# Patient Record
Sex: MALE | Race: BLACK OR AFRICAN AMERICAN | Employment: OTHER | URBAN - METROPOLITAN AREA
[De-identification: names, ages, dates, MRNs, and addresses within clinical notes are randomized per-mention and may not be internally consistent; named-entity substitution may affect disease eponyms.]

---

## 2017-01-29 ENCOUNTER — APPOINTMENT (OUTPATIENT)
Dept: CT IMAGING | Age: 37
End: 2017-01-29
Payer: SELF-PAY

## 2017-01-29 ENCOUNTER — HOSPITAL ENCOUNTER (EMERGENCY)
Age: 37
Discharge: HOME | End: 2017-01-29
Attending: EMERGENCY MEDICINE | Admitting: EMERGENCY MEDICINE
Payer: SELF-PAY

## 2017-01-29 ENCOUNTER — APPOINTMENT (OUTPATIENT)
Dept: ULTRASOUND IMAGING | Age: 37
End: 2017-01-29
Payer: SELF-PAY

## 2017-01-29 VITALS
DIASTOLIC BLOOD PRESSURE: 72 MMHG | HEIGHT: 68 IN | WEIGHT: 254.85 LBS | HEART RATE: 69 BPM | BODY MASS INDEX: 38.62 KG/M2 | SYSTOLIC BLOOD PRESSURE: 141 MMHG | TEMPERATURE: 98.1 F | OXYGEN SATURATION: 98 % | RESPIRATION RATE: 16 BRPM

## 2017-01-29 DIAGNOSIS — R19.7 DIARRHEA, UNSPECIFIED TYPE: ICD-10-CM

## 2017-01-29 DIAGNOSIS — R10.13 ABDOMINAL PAIN, EPIGASTRIC: Primary | ICD-10-CM

## 2017-01-29 DIAGNOSIS — R11.2 NON-INTRACTABLE VOMITING WITH NAUSEA, UNSPECIFIED VOMITING TYPE: ICD-10-CM

## 2017-01-29 LAB
ALBUMIN SERPL BCP-MCNC: 3.8 G/DL (ref 3.5–5)
ALBUMIN/GLOB SERPL: 0.9 {RATIO} (ref 1.1–2.2)
ALP SERPL-CCNC: 53 U/L (ref 45–117)
ALT SERPL-CCNC: 29 U/L (ref 12–78)
ANION GAP BLD CALC-SCNC: 7 MMOL/L (ref 5–15)
APPEARANCE UR: CLEAR
AST SERPL W P-5'-P-CCNC: 26 U/L (ref 15–37)
BACTERIA URNS QL MICRO: NEGATIVE /HPF
BASOPHILS # BLD AUTO: 0 K/UL (ref 0–0.1)
BASOPHILS # BLD: 0 % (ref 0–1)
BILIRUB SERPL-MCNC: 0.3 MG/DL (ref 0.2–1)
BILIRUB UR QL: NEGATIVE
BUN SERPL-MCNC: 6 MG/DL (ref 6–20)
BUN/CREAT SERPL: 6 (ref 12–20)
CALCIUM SERPL-MCNC: 9 MG/DL (ref 8.5–10.1)
CHLORIDE SERPL-SCNC: 106 MMOL/L (ref 97–108)
CO2 SERPL-SCNC: 28 MMOL/L (ref 21–32)
COLOR UR: NORMAL
CREAT SERPL-MCNC: 1.06 MG/DL (ref 0.7–1.3)
EOSINOPHIL # BLD: 0 K/UL (ref 0–0.4)
EOSINOPHIL NFR BLD: 1 % (ref 0–7)
EPITH CASTS URNS QL MICRO: NORMAL /LPF
ERYTHROCYTE [DISTWIDTH] IN BLOOD BY AUTOMATED COUNT: 13.4 % (ref 11.5–14.5)
GLOBULIN SER CALC-MCNC: 4.1 G/DL (ref 2–4)
GLUCOSE SERPL-MCNC: 98 MG/DL (ref 65–100)
GLUCOSE UR STRIP.AUTO-MCNC: NEGATIVE MG/DL
HCT VFR BLD AUTO: 48 % (ref 36.6–50.3)
HGB BLD-MCNC: 15.9 G/DL (ref 12.1–17)
HGB UR QL STRIP: NEGATIVE
HYALINE CASTS URNS QL MICRO: NORMAL /LPF (ref 0–5)
KETONES UR QL STRIP.AUTO: NEGATIVE MG/DL
LACTATE SERPL-SCNC: 1.2 MMOL/L (ref 0.4–2)
LEUKOCYTE ESTERASE UR QL STRIP.AUTO: NEGATIVE
LIPASE SERPL-CCNC: 75 U/L (ref 73–393)
LYMPHOCYTES # BLD AUTO: 40 % (ref 12–49)
LYMPHOCYTES # BLD: 1.8 K/UL (ref 0.8–3.5)
MCH RBC QN AUTO: 30.9 PG (ref 26–34)
MCHC RBC AUTO-ENTMCNC: 33.1 G/DL (ref 30–36.5)
MCV RBC AUTO: 93.2 FL (ref 80–99)
MONOCYTES # BLD: 0.5 K/UL (ref 0–1)
MONOCYTES NFR BLD AUTO: 11 % (ref 5–13)
NEUTS SEG # BLD: 2.2 K/UL (ref 1.8–8)
NEUTS SEG NFR BLD AUTO: 48 % (ref 32–75)
NITRITE UR QL STRIP.AUTO: NEGATIVE
PH UR STRIP: 6.5 [PH] (ref 5–8)
PLATELET # BLD AUTO: 176 K/UL (ref 150–400)
POTASSIUM SERPL-SCNC: 3.9 MMOL/L (ref 3.5–5.1)
PROT SERPL-MCNC: 7.9 G/DL (ref 6.4–8.2)
PROT UR STRIP-MCNC: NEGATIVE MG/DL
RBC # BLD AUTO: 5.15 M/UL (ref 4.1–5.7)
RBC #/AREA URNS HPF: NORMAL /HPF (ref 0–5)
SODIUM SERPL-SCNC: 141 MMOL/L (ref 136–145)
SP GR UR REFRACTOMETRY: 1.01 (ref 1–1.03)
UA: UC IF INDICATED,UAUC: NORMAL
UROBILINOGEN UR QL STRIP.AUTO: 0.2 EU/DL (ref 0.2–1)
WBC # BLD AUTO: 4.5 K/UL (ref 4.1–11.1)
WBC URNS QL MICRO: NORMAL /HPF (ref 0–4)

## 2017-01-29 PROCEDURE — 93005 ELECTROCARDIOGRAM TRACING: CPT

## 2017-01-29 PROCEDURE — 83690 ASSAY OF LIPASE: CPT | Performed by: EMERGENCY MEDICINE

## 2017-01-29 PROCEDURE — 96374 THER/PROPH/DIAG INJ IV PUSH: CPT

## 2017-01-29 PROCEDURE — 76705 ECHO EXAM OF ABDOMEN: CPT

## 2017-01-29 PROCEDURE — 96376 TX/PRO/DX INJ SAME DRUG ADON: CPT

## 2017-01-29 PROCEDURE — 74011636320 HC RX REV CODE- 636/320: Performed by: EMERGENCY MEDICINE

## 2017-01-29 PROCEDURE — 80053 COMPREHEN METABOLIC PANEL: CPT | Performed by: EMERGENCY MEDICINE

## 2017-01-29 PROCEDURE — C9113 INJ PANTOPRAZOLE SODIUM, VIA: HCPCS | Performed by: PHYSICIAN ASSISTANT

## 2017-01-29 PROCEDURE — 74011250636 HC RX REV CODE- 250/636: Performed by: PHYSICIAN ASSISTANT

## 2017-01-29 PROCEDURE — 36415 COLL VENOUS BLD VENIPUNCTURE: CPT | Performed by: EMERGENCY MEDICINE

## 2017-01-29 PROCEDURE — 74011636320 HC RX REV CODE- 636/320: Performed by: PHYSICIAN ASSISTANT

## 2017-01-29 PROCEDURE — 81001 URINALYSIS AUTO W/SCOPE: CPT | Performed by: PHYSICIAN ASSISTANT

## 2017-01-29 PROCEDURE — 85025 COMPLETE CBC W/AUTO DIFF WBC: CPT | Performed by: EMERGENCY MEDICINE

## 2017-01-29 PROCEDURE — 83605 ASSAY OF LACTIC ACID: CPT | Performed by: EMERGENCY MEDICINE

## 2017-01-29 PROCEDURE — 74177 CT ABD & PELVIS W/CONTRAST: CPT

## 2017-01-29 PROCEDURE — 96375 TX/PRO/DX INJ NEW DRUG ADDON: CPT

## 2017-01-29 PROCEDURE — 74011250636 HC RX REV CODE- 250/636: Performed by: EMERGENCY MEDICINE

## 2017-01-29 PROCEDURE — 99284 EMERGENCY DEPT VISIT MOD MDM: CPT

## 2017-01-29 RX ORDER — ONDANSETRON 2 MG/ML
4 INJECTION INTRAMUSCULAR; INTRAVENOUS
Status: COMPLETED | OUTPATIENT
Start: 2017-01-29 | End: 2017-01-29

## 2017-01-29 RX ORDER — SODIUM CHLORIDE 9 MG/ML
50 INJECTION, SOLUTION INTRAVENOUS
Status: COMPLETED | OUTPATIENT
Start: 2017-01-29 | End: 2017-01-29

## 2017-01-29 RX ORDER — OXYCODONE AND ACETAMINOPHEN 5; 325 MG/1; MG/1
1 TABLET ORAL
Qty: 20 TAB | Refills: 0 | Status: SHIPPED | OUTPATIENT
Start: 2017-01-29 | End: 2017-02-06

## 2017-01-29 RX ORDER — PROMETHAZINE HYDROCHLORIDE 25 MG/1
25 TABLET ORAL
Qty: 20 TAB | Refills: 0 | Status: SHIPPED | OUTPATIENT
Start: 2017-01-29 | End: 2017-02-06

## 2017-01-29 RX ORDER — SODIUM CHLORIDE 0.9 % (FLUSH) 0.9 %
10 SYRINGE (ML) INJECTION
Status: COMPLETED | OUTPATIENT
Start: 2017-01-29 | End: 2017-01-29

## 2017-01-29 RX ORDER — HYDROMORPHONE HYDROCHLORIDE 1 MG/ML
1 INJECTION, SOLUTION INTRAMUSCULAR; INTRAVENOUS; SUBCUTANEOUS
Status: COMPLETED | OUTPATIENT
Start: 2017-01-29 | End: 2017-01-29

## 2017-01-29 RX ORDER — PANTOPRAZOLE SODIUM 40 MG/10ML
40 INJECTION, POWDER, LYOPHILIZED, FOR SOLUTION INTRAVENOUS
Status: COMPLETED | OUTPATIENT
Start: 2017-01-29 | End: 2017-01-29

## 2017-01-29 RX ORDER — POLYETHYLENE GLYCOL 3350 17 G/17G
17 POWDER, FOR SOLUTION ORAL DAILY
Qty: 119 G | Refills: 0 | Status: SHIPPED | OUTPATIENT
Start: 2017-01-29 | End: 2017-02-01

## 2017-01-29 RX ADMIN — IOPAMIDOL 100 ML: 755 INJECTION, SOLUTION INTRAVENOUS at 17:07

## 2017-01-29 RX ADMIN — Medication 10 ML: at 17:08

## 2017-01-29 RX ADMIN — HYDROMORPHONE HYDROCHLORIDE 1 MG: 1 INJECTION, SOLUTION INTRAMUSCULAR; INTRAVENOUS; SUBCUTANEOUS at 17:30

## 2017-01-29 RX ADMIN — HYDROMORPHONE HYDROCHLORIDE 1 MG: 1 INJECTION, SOLUTION INTRAMUSCULAR; INTRAVENOUS; SUBCUTANEOUS at 13:53

## 2017-01-29 RX ADMIN — SODIUM CHLORIDE 50 ML/HR: 900 INJECTION, SOLUTION INTRAVENOUS at 17:08

## 2017-01-29 RX ADMIN — DIATRIZOATE MEGLUMINE AND DIATRIZOATE SODIUM 30 ML: 600; 100 SOLUTION ORAL; RECTAL at 15:25

## 2017-01-29 RX ADMIN — ONDANSETRON 4 MG: 2 INJECTION INTRAMUSCULAR; INTRAVENOUS at 17:29

## 2017-01-29 RX ADMIN — PANTOPRAZOLE SODIUM 40 MG: 40 INJECTION, POWDER, FOR SOLUTION INTRAVENOUS at 13:53

## 2017-01-29 RX ADMIN — ONDANSETRON 4 MG: 2 INJECTION INTRAMUSCULAR; INTRAVENOUS at 13:08

## 2017-01-29 RX ADMIN — HYDROMORPHONE HYDROCHLORIDE 1 MG: 1 INJECTION, SOLUTION INTRAMUSCULAR; INTRAVENOUS; SUBCUTANEOUS at 13:08

## 2017-01-29 NOTE — DISCHARGE INSTRUCTIONS
Abdominal Pain: Care Instructions  Your Care Instructions    Abdominal pain has many possible causes. Some aren't serious and get better on their own in a few days. Others need more testing and treatment. If your pain continues or gets worse, you need to be rechecked and may need more tests to find out what is wrong. You may need surgery to correct the problem. Don't ignore new symptoms, such as fever, nausea and vomiting, urination problems, pain that gets worse, and dizziness. These may be signs of a more serious problem. Your doctor may have recommended a follow-up visit in the next 8 to 12 hours. If you are not getting better, you may need more tests or treatment. The doctor has checked you carefully, but problems can develop later. If you notice any problems or new symptoms, get medical treatment right away. Follow-up care is a key part of your treatment and safety. Be sure to make and go to all appointments, and call your doctor if you are having problems. It's also a good idea to know your test results and keep a list of the medicines you take. How can you care for yourself at home? · Rest until you feel better. · To prevent dehydration, drink plenty of fluids, enough so that your urine is light yellow or clear like water. Choose water and other caffeine-free clear liquids until you feel better. If you have kidney, heart, or liver disease and have to limit fluids, talk with your doctor before you increase the amount of fluids you drink. · If your stomach is upset, eat mild foods, such as rice, dry toast or crackers, bananas, and applesauce. Try eating several small meals instead of two or three large ones. · Wait until 48 hours after all symptoms have gone away before you have spicy foods, alcohol, and drinks that contain caffeine. · Do not eat foods that are high in fat. · Avoid anti-inflammatory medicines such as aspirin, ibuprofen (Advil, Motrin), and naproxen (Aleve).  These can cause stomach upset. Talk to your doctor if you take daily aspirin for another health problem. When should you call for help? Call 911 anytime you think you may need emergency care. For example, call if:  · You passed out (lost consciousness). · You pass maroon or very bloody stools. · You vomit blood or what looks like coffee grounds. · You have new, severe belly pain. Call your doctor now or seek immediate medical care if:  · Your pain gets worse, especially if it becomes focused in one area of your belly. · You have a new or higher fever. · Your stools are black and look like tar, or they have streaks of blood. · You have unexpected vaginal bleeding. · You have symptoms of a urinary tract infection. These may include:  ¨ Pain when you urinate. ¨ Urinating more often than usual.  ¨ Blood in your urine. · You are dizzy or lightheaded, or you feel like you may faint. Watch closely for changes in your health, and be sure to contact your doctor if:  · You are not getting better after 1 day (24 hours). Where can you learn more? Go to http://penny-rikki.info/. Enter E772 in the search box to learn more about \"Abdominal Pain: Care Instructions. \"  Current as of: May 27, 2016  Content Version: 11.1  © 0703-4760 [employer]. Care instructions adapted under license by [employer] (which disclaims liability or warranty for this information). If you have questions about a medical condition or this instruction, always ask your healthcare professional. [place] [address] any warranty or liability for your use of this information. Nausea and Vomiting: Care Instructions  Your Care Instructions    When you are nauseated, you may feel weak and sweaty and notice a lot of saliva in your mouth. Nausea often leads to vomiting. Most of the time you do not need to worry about nausea and vomiting, but they can be signs of other illnesses.   Two common causes of nausea and vomiting are stomach flu and food poisoning. Nausea and vomiting from viral stomach flu will usually start to improve within 24 hours. Nausea and vomiting from food poisoning may last from 12 to 48 hours. The doctor has checked you carefully, but problems can develop later. If you notice any problems or new symptoms, get medical treatment right away. Follow-up care is a key part of your treatment and safety. Be sure to make and go to all appointments, and call your doctor if you are having problems. It's also a good idea to know your test results and keep a list of the medicines you take. How can you care for yourself at home? · To prevent dehydration, drink plenty of fluids, enough so that your urine is light yellow or clear like water. Choose water and other caffeine-free clear liquids until you feel better. If you have kidney, heart, or liver disease and have to limit fluids, talk with your doctor before you increase the amount of fluids you drink. · Rest in bed until you feel better. · When you are able to eat, try clear soups, mild foods, and liquids until all symptoms are gone for 12 to 48 hours. Other good choices include dry toast, crackers, cooked cereal, and gelatin dessert, such as Jell-O. When should you call for help? Call 911 anytime you think you may need emergency care. For example, call if:  · You passed out (lost consciousness). Call your doctor now or seek immediate medical care if:  · You have symptoms of dehydration, such as:  ¨ Dry eyes and a dry mouth. ¨ Passing only a little dark urine. ¨ Feeling thirstier than usual.  · You have new or worsening belly pain. · You have a new or higher fever. · You vomit blood or what looks like coffee grounds. Watch closely for changes in your health, and be sure to contact your doctor if:  · You have ongoing nausea and vomiting. · Your vomiting is getting worse. · Your vomiting lasts longer than 2 days.   · You are not getting better as expected. Where can you learn more? Go to http://penny-rikki.info/. Enter 25 [id number] in the search box to learn more about \"Nausea and Vomiting: Care Instructions. \"  Current as of: May 27, 2016  Content Version: 11.1  © 5028-9531 [employer]. Care instructions adapted under license by [employer] (which disclaims liability or warranty for this information). If you have questions about a medical condition or this instruction, always ask your healthcare professional. [place] [address] any warranty or liability for your use of this information. Diarrhea: Care Instructions  Your Care Instructions    Diarrhea is loose, watery stools (bowel movements). The exact cause is often hard to find. Sometimes diarrhea is your body's way of getting rid of what caused an upset stomach. Viruses, food poisoning, and many medicines can cause diarrhea. Some people get diarrhea in response to emotional stress, anxiety, or certain foods. Almost everyone has diarrhea now and then. It usually isn't serious, and your stools will return to normal soon. The important thing to do is replace the fluids you have lost, so you can prevent dehydration. The doctor has checked you carefully, but problems can develop later. If you notice any problems or new symptoms, get medical treatment right away. Follow-up care is a key part of your treatment and safety. Be sure to make and go to all appointments, and call your doctor if you are having problems. It's also a good idea to know your test results and keep a list of the medicines you take. How can you care for yourself at home? · Watch for signs of dehydration, which means your body has lost too much water. Dehydration is a serious condition and should be treated right away. Signs of dehydration are:  ¨ Increasing thirst and dry eyes and mouth. ¨ Feeling faint or lightheaded.   ¨ Darker urine, and a smaller amount of urine than normal.  · To prevent dehydration, drink plenty of fluids, enough so that your urine is light yellow or clear like water. Choose water and other caffeine-free clear liquids until you feel better. If you have kidney, heart, or liver disease and have to limit fluids, talk with your doctor before you increase the amount of fluids you drink. · Begin eating small amounts of mild foods the next day, if you feel like it. ¨ Try yogurt that has live cultures of Lactobacillus. (Check the label.)  ¨ Avoid spicy foods, fruits, alcohol, and caffeine until 48 hours after all symptoms are gone. ¨ Avoid chewing gum that contains sorbitol. ¨ Avoid dairy products (except for yogurt with Lactobacillus) while you have diarrhea and for 3 days after symptoms are gone. · The doctor may recommend that you take over-the-counter medicine, such as loperamide (Imodium), if you still have diarrhea after 6 hours. Read and follow all instructions on the label. Do not use this medicine if you have bloody diarrhea, a high fever, or other signs of serious illness. Call your doctor if you think you are having a problem with your medicine. When should you call for help? Call 911 anytime you think you may need emergency care. For example, call if:  · You passed out (lost consciousness). · Your stools are maroon or very bloody. Call your doctor now or seek immediate medical care if:  · You are dizzy or lightheaded, or you feel like you may faint. · Your stools are black and look like tar, or they have streaks of blood. · You have new or worse belly pain. · You have symptoms of dehydration, such as:  ¨ Dry eyes and a dry mouth. ¨ Passing only a little dark urine. ¨ Feeling thirstier than usual.  · You have a new or higher fever. Watch closely for changes in your health, and be sure to contact your doctor if:  · Your diarrhea is getting worse. · You see pus in the diarrhea. · You are not getting better after 2 days (48 hours).   Where can you learn more? Go to http://penny-rikki.info/. Enter W586 in the search box to learn more about \"Diarrhea: Care Instructions. \"  Current as of: May 27, 2016  Content Version: 11.1  © 6019-2186 [employer]. Care instructions adapted under license by [employer] (which disclaims liability or warranty for this information). If you have questions about a medical condition or this instruction, always ask your healthcare professional. [place] [address] any warranty or liability for your use of this information.

## 2017-01-29 NOTE — ED PROVIDER NOTES
HPI Comments: Severo Rama is a 31 y.o. male with hx significant for asthma who presents ambulatory to ED [facility] ED with cc of intermittent epigastric and periumbilical abdominal pain since 3/2016 which has progressively worsened in the last few days. Pt reports pain is exacerbated with deep breathing. Pt additionally c/o associated nausea, vomiting, and diarrhea. He has been to the ED multiple times for the same symptoms and was diagnosed with gastritis multiple times. He has never seen/followed up with GI. He denies h/o cholecystectomy. He last had an abdominal US about 1 month ago which was negative for cholecystitis. Pt has taken Rolaids, Tylenol, Tums, and Gas-X, with no relief. He denies frequent use of NSAIDs. Pt notes he has taken a GI cocktail in the past which significantly worsened his symptoms. Pt denies family h/o colitis or Crohn's disease. Pt denies personal h/o kidney stones. Pt denies any CP, SOB, hematochezia, fever, or chills. Social Hx: +former tobacco, +EtOH, -illicit drug usage    There are no other complaints, changes or physical findings at this time. The history is provided by the patient. Past Medical History:   Diagnosis Date    Asthma        History reviewed. No pertinent past surgical history. History reviewed. No pertinent family history. Social History     Social History    Marital status: SINGLE     Spouse name: N/A    Number of children: N/A    Years of education: N/A     Occupational History    Not on file. Social History Main Topics    Smoking status: Former Smoker     Packs/day: 1.00     Years: 3.00    Smokeless tobacco: Not on file    Alcohol use Yes    Drug use: No    Sexual activity: Yes     Partners: Female     Other Topics Concern    Not on file     Social History Narrative         ALLERGIES: Toradol [ketorolac tromethamine]; Motrin [ibuprofen]; and Ultram [tramadol]    Review of Systems   Constitutional: Negative for chills and fever. HENT: Negative for congestion, rhinorrhea and sore throat. Respiratory: Negative for cough and shortness of breath. Cardiovascular: Negative for chest pain and palpitations. Gastrointestinal: Positive for abdominal pain, diarrhea, nausea and vomiting. Genitourinary: Negative for dysuria and hematuria. Musculoskeletal: Negative for neck pain and neck stiffness. Skin: Negative for rash and wound. Neurological: Negative for dizziness and headaches. Psychiatric/Behavioral: Negative for agitation and confusion. Patient Vitals for the past 12 hrs:   Temp Pulse Resp BP SpO2   01/29/17 1745 - - - 141/72 98 %   01/29/17 1710 - - - - 98 %   01/29/17 1630 - - - 156/57 98 %   01/29/17 1515 - - - 104/68 99 %   01/29/17 1323 - - - - 97 %   01/29/17 1230 - - - 140/72 100 %   01/29/17 1211 98.1 °F (36.7 °C) 69 16 - 100 %       Physical Exam   Constitutional: He is oriented to person, place, and time. He appears well-developed and well-nourished. No distress. HENT:   Head: Normocephalic and atraumatic. Nose: Nose normal.   Mouth/Throat: Oropharynx is clear and moist. No oropharyngeal exudate. Eyes: Conjunctivae and EOM are normal. Right eye exhibits no discharge. Left eye exhibits no discharge. No scleral icterus. Neck: Normal range of motion. Neck supple. No JVD present. No tracheal deviation present. No thyromegaly present. Cardiovascular: Normal rate, regular rhythm and normal heart sounds. Pulmonary/Chest: Effort normal and breath sounds normal. No respiratory distress. He has no wheezes. Abdominal: Soft. Notable discomfort in the RUQ, epigastrium, and periumbilical region, otherwise mild diffuse tenderness  No CVAT   Musculoskeletal: Normal range of motion. He exhibits no edema. Lymphadenopathy:     He has no cervical adenopathy. Neurological: He is alert and oriented to person, place, and time. He exhibits normal muscle tone. Coordination normal.   Skin: Skin is warm and dry.  He is not diaphoretic. Psychiatric: He has a normal mood and affect. His behavior is normal. Judgment normal.   Nursing note and vitals reviewed. MDM  Number of Diagnoses or Management Options  Diagnosis management comments: DDx: cholecystitis, cholelithiasis, PUD, GERD       Amount and/or Complexity of Data Reviewed  Clinical lab tests: ordered and reviewed  Tests in the radiology section of CPT®: ordered and reviewed  Tests in the medicine section of CPT®: ordered and reviewed  Review and summarize past medical records: yes  Independent visualization of images, tracings, or specimens: yes    Patient Progress  Patient progress: stable    ED Course       Procedures      EKG interpretation: (Preliminary) 12:30 PM  Rhythm: normal sinus rhythm; and regular . Rate (approx.): 70; Axis: normal; P wave: normal; QRS interval: normal ; ST/T wave: normal;   Written by James Mejia ED Scribe, as dictated by Joseph Stroud MD.      PROGRESS NOTE:  2:26 PM  Pt reevaluated. His pain has improved. Pt yet to go to [address]. Currently attempting to expedite pt going to [address]. ED nurse offering to transport pt to [address] via wheelchair. Written by James Mejia ED Scribe, as dictated by Ignacio Torres.       LABORATORY TESTS:  Recent Results (from the past 12 hour(s))   EKG, 12 LEAD, INITIAL    Collection Time: 01/29/17 12:30 PM   Result Value Ref Range    Ventricular Rate 70 BPM    Atrial Rate 70 BPM    P-R Interval 146 ms    QRS Duration 86 ms    Q-T Interval 380 ms    QTC Calculation (Bezet) 410 ms    Calculated P Axis 67 degrees    Calculated R Axis 5 degrees    Calculated T Axis 8 degrees    Diagnosis       Normal sinus rhythm  Minimal voltage criteria for LVH, may be normal variant  No previous ECGs available     CBC WITH AUTOMATED DIFF    Collection Time: 01/29/17 12:35 PM   Result Value Ref Range    WBC 4.5 4.1 - 11.1 K/uL    RBC 5.15 4.10 - 5.70 M/uL    HGB 15.9 12.1 - 17.0 g/dL    HCT 48.0 36.6 - 50.3 %    MCV 93.2 80.0 - 99.0 FL    MCH 30.9 26.0 - 34.0 PG    MCHC 33.1 30.0 - 36.5 g/dL    RDW 13.4 11.5 - 14.5 %    PLATELET 372 853 - 641 K/uL    NEUTROPHILS 48 32 - 75 %    LYMPHOCYTES 40 12 - 49 %    MONOCYTES 11 5 - 13 %    EOSINOPHILS 1 0 - 7 %    BASOPHILS 0 0 - 1 %    ABS. NEUTROPHILS 2.2 1.8 - 8.0 K/UL    ABS. LYMPHOCYTES 1.8 0.8 - 3.5 K/UL    ABS. MONOCYTES 0.5 0.0 - 1.0 K/UL    ABS. EOSINOPHILS 0.0 0.0 - 0.4 K/UL    ABS. BASOPHILS 0.0 0.0 - 0.1 K/UL   METABOLIC PANEL, COMPREHENSIVE    Collection Time: 01/29/17 12:35 PM   Result Value Ref Range    Sodium 141 136 - 145 mmol/L    Potassium 3.9 3.5 - 5.1 mmol/L    Chloride 106 97 - 108 mmol/L    CO2 28 21 - 32 mmol/L    Anion gap 7 5 - 15 mmol/L    Glucose 98 65 - 100 mg/dL    BUN 6 6 - 20 MG/DL    Creatinine 1.06 0.70 - 1.30 MG/DL    BUN/Creatinine ratio 6 (L) 12 - 20      GFR est AA >60 >60 ml/min/1.73m2    GFR est non-AA >60 >60 ml/min/1.73m2    Calcium 9.0 8.5 - 10.1 MG/DL    Bilirubin, total 0.3 0.2 - 1.0 MG/DL    ALT 29 12 - 78 U/L    AST 26 15 - 37 U/L    Alk.  phosphatase 53 45 - 117 U/L    Protein, total 7.9 6.4 - 8.2 g/dL    Albumin 3.8 3.5 - 5.0 g/dL    Globulin 4.1 (H) 2.0 - 4.0 g/dL    A-G Ratio 0.9 (L) 1.1 - 2.2     LIPASE    Collection Time: 01/29/17 12:35 PM   Result Value Ref Range    Lipase 75 73 - 393 U/L   URINALYSIS W/ REFLEX CULTURE    Collection Time: 01/29/17 12:35 PM   Result Value Ref Range    Color YELLOW/STRAW      Appearance CLEAR CLEAR      Specific gravity 1.007 1.003 - 1.030      pH (UA) 6.5 5.0 - 8.0      Protein NEGATIVE  NEG mg/dL    Glucose NEGATIVE  NEG mg/dL    Ketone NEGATIVE  NEG mg/dL    Bilirubin NEGATIVE  NEG      Blood NEGATIVE  NEG      Urobilinogen 0.2 0.2 - 1.0 EU/dL    Nitrites NEGATIVE  NEG      Leukocyte Esterase NEGATIVE  NEG      WBC 0-4 0 - 4 /hpf    RBC 0-5 0 - 5 /hpf    Epithelial cells FEW FEW /lpf    Bacteria NEGATIVE  NEG /hpf    UA:UC IF INDICATED CULTURE NOT INDICATED BY UA RESULT CNI      Hyaline Cast 0-2 0 - 5 /lpf   LACTIC ACID, PLASMA    Collection Time: 01/29/17 12:35 PM   Result Value Ref Range    Lactic acid 1.2 0.4 - 2.0 MMOL/L       IMAGING RESULTS:  CT Results  (Last 48 hours)               01/29/17 1707  CT ABD PELV W CONT Final result    Impression:  IMPRESSION:    There is no acute abnormality in the abdomen or pelvis. Narrative:  EXAM:  CT ABD PELV W CONT       INDICATION: Intermittent abdominal pain since March. Nausea and vomiting. COMPARISON: CT 3/25/2009. TECHNIQUE: Helical CT of the abdomen  and pelvis  with oral  contrast following   the uneventful intravenous administration of nonionic contrast.  Coronal and   sagittal reformats are performed. CT dose reduction was achieved through use of   a standardized protocol tailored for this examination and automatic exposure   control for dose modulation. FINDINGS:    The visualized lung bases demonstrate no mass or consolidation. The heart size   is normal. There is no pericardial or pleural effusion. The liver, spleen, pancreas, and adrenal glands are normal. The kidneys are   symmetric without hydronephrosis. Bilateral kidney cysts measuring 10 mm on the   right and 12 mm on the left. The gall bladder is present  without intra- or   extra-hepatic biliary dilatation. There are no dilated bowel loops. The appendix is normal.  There are no   enlarged lymph nodes. There is no free fluid or free air. The aorta tapers   without aneurysm. The urinary bladder is normal.  There is no pelvic mass. The bony structures are age-appropriate. US ABD LTD   Final Result   EXAM: US ABD LTD     INDICATION: Epigastric and right upper abdomen pain.     COMPARISON: 8/29/2016.     TECHNIQUE: Limited abdominal ultrasound.     FINDINGS:      Liver: Echogenicity is within normal limits. No focal liver lesion.      Main portal vein flow: Toward the liver.     Fluid: No ascites.     Gallbladder: Within normal limits. No gallstones. No gallbladder wall thickening  or pericholecystic fluid. Negative sonographic Tol[place] sign.      Bile ducts: There is no intra or extrahepatic biliary ductal dilatation. The  common bile duct measures 4 mm.     Pancreas: Not well seen due to bowel gas.     Kidneys: Right length: 8.7 cm. No hydronephrosis.     IMPRESSION  IMPRESSION: No acute abnormality is seen in the right upper abdomen.                    MEDICATIONS GIVEN:  Medications   HYDROmorphone (PF) (DILAUDID) injection 1 mg (1 mg IntraVENous Given 1/29/17 1308)   ondansetron (ZOFRAN) injection 4 mg (4 mg IntraVENous Given 1/29/17 1308)   pantoprazole (PROTONIX) injection 40 mg (40 mg IntraVENous Given 1/29/17 1353)   HYDROmorphone (PF) (DILAUDID) injection 1 mg (1 mg IntraVENous Given 1/29/17 1353)   diatrizoate meglumine-d.sodium (MD-GASTROVIEW,GASTROGRAFIN) 66-10 % contrast solution 30 mL (30 mL Oral Given 1/29/17 1525)   0.9% sodium chloride infusion (50 mL/hr IntraVENous New Bag 1/29/17 1708)   iopamidol (ISOVUE-370) 76 % injection 100 mL (100 mL IntraVENous Given 1/29/17 1707)   sodium chloride (NS) flush 10 mL (10 mL IntraVENous Given 1/29/17 1708)   ondansetron (ZOFRAN) injection 4 mg (4 mg IntraVENous Given 1/29/17 1729)   HYDROmorphone (PF) (DILAUDID) injection 1 mg (1 mg IntraVENous Given 1/29/17 1730)       IMPRESSION:  1. Abdominal pain, epigastric    2. Non-intractable vomiting with nausea, unspecified vomiting type    3. Diarrhea, unspecified type        PLAN:  Current Discharge Medication List      START taking these medications    Details   oxyCODONE-acetaminophen (PERCOCET) 5-325 mg per tablet Take 1 Tab by mouth every four (4) hours as needed for Pain for up to 20 doses. Max Daily Amount: 6 Tabs. Qty: 20 Tab, Refills: 0      promethazine (PHENERGAN) 25 mg tablet Take 1 Tab by mouth every six (6) hours as needed for Nausea for up to 20 doses.   Qty: 20 Tab, Refills: 0      polyethylene glycol (MIRALAX) 17 gram/dose powder Take 17 g by mouth daily for 3 days. 1 tablespoon with 8 oz of water daily  Qty: 119 g, Refills: 0           Follow-up Information     Follow up With Details Comments Anthony 54, MD Tierney Gomez [address]  [place]  [phone]      [facility] EMERGENCY DEPT  If symptoms worsen [address]        Return to ED if worse     DISCHARGE NOTE:  5:58 PM  Pt has been reexamined. Pt has no new complaints, changes, or physical findings. Care plan outlined and precautions discussed. All available results reviewed with pt. All medications reviewed with pt. All of pts questions and concerns addressed. Pt agrees to f/u as instructed and agrees to return to ED upon further deterioration. Pt is ready to go home. ATTESTATION:  This note is prepared by Dorie Soulier, acting as Scribe for Claire Machado. ANUP Uribe and personally reviewed by me in its entirety. I confirm that the note above accurately reflects all work, treatment, procedures, and medical decision making performed by me.

## 2017-01-29 NOTE — ED NOTES
1230- Assumed care of patient. Patient is alert and oriented, aooears t to be in pain. Patient ambulatory to ED with c/o abdominal pain with n/v/d intermittently over the past few months with pain that increased today. Bowel sounds active, abdomen soft and tender to umbilical area. Monitor x 2 applied. Patient positioned for comfort with call bell within reach. Side rails up for safety. Provider to evaluate patient. 1300- PA has evaluated patient. Patient medicated per order.

## 2017-01-29 NOTE — ED NOTES
Discharge instructions given to and reviewed with patient by MICKEY Garrett. Patient verbalized his understanding and was discharged home ambulatory in [facility].

## 2017-01-30 LAB
ATRIAL RATE: 70 BPM
CALCULATED P AXIS, ECG09: 67 DEGREES
CALCULATED R AXIS, ECG10: 5 DEGREES
CALCULATED T AXIS, ECG11: 8 DEGREES
DIAGNOSIS, 93000: NORMAL
P-R INTERVAL, ECG05: 146 MS
Q-T INTERVAL, ECG07: 380 MS
QRS DURATION, ECG06: 86 MS
QTC CALCULATION (BEZET), ECG08: 410 MS
VENTRICULAR RATE, ECG03: 70 BPM

## 2017-02-06 ENCOUNTER — APPOINTMENT (OUTPATIENT)
Dept: GENERAL RADIOLOGY | Age: 37
End: 2017-02-06
Attending: EMERGENCY MEDICINE
Payer: SELF-PAY

## 2017-02-06 ENCOUNTER — HOSPITAL ENCOUNTER (EMERGENCY)
Age: 37
Discharge: HOME | End: 2017-02-06
Attending: EMERGENCY MEDICINE
Payer: SELF-PAY

## 2017-02-06 VITALS
RESPIRATION RATE: 16 BRPM | HEART RATE: 84 BPM | HEIGHT: 68 IN | DIASTOLIC BLOOD PRESSURE: 80 MMHG | BODY MASS INDEX: 39.39 KG/M2 | TEMPERATURE: 97.9 F | SYSTOLIC BLOOD PRESSURE: 120 MMHG | OXYGEN SATURATION: 99 % | WEIGHT: 259.92 LBS

## 2017-02-06 DIAGNOSIS — R10.13 ABDOMINAL PAIN, EPIGASTRIC: ICD-10-CM

## 2017-02-06 DIAGNOSIS — R10.9 ABDOMINAL CRAMPING: Primary | ICD-10-CM

## 2017-02-06 LAB
ALBUMIN SERPL BCP-MCNC: 3.7 G/DL (ref 3.5–5)
ALBUMIN/GLOB SERPL: 1 {RATIO} (ref 1.1–2.2)
ALP SERPL-CCNC: 57 U/L (ref 45–117)
ALT SERPL-CCNC: 56 U/L (ref 12–78)
ANION GAP BLD CALC-SCNC: 8 MMOL/L (ref 5–15)
AST SERPL W P-5'-P-CCNC: 35 U/L (ref 15–37)
BASOPHILS # BLD AUTO: 0 K/UL (ref 0–0.1)
BASOPHILS # BLD: 0 % (ref 0–1)
BILIRUB SERPL-MCNC: 0.2 MG/DL (ref 0.2–1)
BUN SERPL-MCNC: 10 MG/DL (ref 6–20)
BUN/CREAT SERPL: 10 (ref 12–20)
CALCIUM SERPL-MCNC: 8.7 MG/DL (ref 8.5–10.1)
CHLORIDE SERPL-SCNC: 108 MMOL/L (ref 97–108)
CO2 SERPL-SCNC: 27 MMOL/L (ref 21–32)
CREAT SERPL-MCNC: 1 MG/DL (ref 0.7–1.3)
EOSINOPHIL # BLD: 0.1 K/UL (ref 0–0.4)
EOSINOPHIL NFR BLD: 1 % (ref 0–7)
ERYTHROCYTE [DISTWIDTH] IN BLOOD BY AUTOMATED COUNT: 13.6 % (ref 11.5–14.5)
GLOBULIN SER CALC-MCNC: 3.8 G/DL (ref 2–4)
GLUCOSE SERPL-MCNC: 88 MG/DL (ref 65–100)
HCT VFR BLD AUTO: 47.5 % (ref 36.6–50.3)
HGB BLD-MCNC: 15.5 G/DL (ref 12.1–17)
LIPASE SERPL-CCNC: 76 U/L (ref 73–393)
LYMPHOCYTES # BLD AUTO: 41 % (ref 12–49)
LYMPHOCYTES # BLD: 2.3 K/UL (ref 0.8–3.5)
MCH RBC QN AUTO: 30.8 PG (ref 26–34)
MCHC RBC AUTO-ENTMCNC: 32.6 G/DL (ref 30–36.5)
MCV RBC AUTO: 94.2 FL (ref 80–99)
MONOCYTES # BLD: 0.6 K/UL (ref 0–1)
MONOCYTES NFR BLD AUTO: 11 % (ref 5–13)
NEUTS SEG # BLD: 2.5 K/UL (ref 1.8–8)
NEUTS SEG NFR BLD AUTO: 47 % (ref 32–75)
PLATELET # BLD AUTO: 188 K/UL (ref 150–400)
POTASSIUM SERPL-SCNC: 4.2 MMOL/L (ref 3.5–5.1)
PROT SERPL-MCNC: 7.5 G/DL (ref 6.4–8.2)
RBC # BLD AUTO: 5.04 M/UL (ref 4.1–5.7)
SODIUM SERPL-SCNC: 143 MMOL/L (ref 136–145)
WBC # BLD AUTO: 5.4 K/UL (ref 4.1–11.1)

## 2017-02-06 PROCEDURE — 99283 EMERGENCY DEPT VISIT LOW MDM: CPT

## 2017-02-06 PROCEDURE — 96361 HYDRATE IV INFUSION ADD-ON: CPT

## 2017-02-06 PROCEDURE — 85025 COMPLETE CBC W/AUTO DIFF WBC: CPT | Performed by: EMERGENCY MEDICINE

## 2017-02-06 PROCEDURE — 74011250637 HC RX REV CODE- 250/637: Performed by: EMERGENCY MEDICINE

## 2017-02-06 PROCEDURE — 74011250636 HC RX REV CODE- 250/636: Performed by: EMERGENCY MEDICINE

## 2017-02-06 PROCEDURE — 74022 RADEX COMPL AQT ABD SERIES: CPT

## 2017-02-06 PROCEDURE — 36415 COLL VENOUS BLD VENIPUNCTURE: CPT | Performed by: EMERGENCY MEDICINE

## 2017-02-06 PROCEDURE — 96375 TX/PRO/DX INJ NEW DRUG ADDON: CPT

## 2017-02-06 PROCEDURE — 80053 COMPREHEN METABOLIC PANEL: CPT | Performed by: EMERGENCY MEDICINE

## 2017-02-06 PROCEDURE — 96374 THER/PROPH/DIAG INJ IV PUSH: CPT

## 2017-02-06 PROCEDURE — C9113 INJ PANTOPRAZOLE SODIUM, VIA: HCPCS | Performed by: EMERGENCY MEDICINE

## 2017-02-06 PROCEDURE — 83690 ASSAY OF LIPASE: CPT | Performed by: EMERGENCY MEDICINE

## 2017-02-06 PROCEDURE — 74011000250 HC RX REV CODE- 250: Performed by: EMERGENCY MEDICINE

## 2017-02-06 RX ORDER — MORPHINE SULFATE 4 MG/ML
4 INJECTION, SOLUTION INTRAMUSCULAR; INTRAVENOUS
Status: COMPLETED | OUTPATIENT
Start: 2017-02-06 | End: 2017-02-06

## 2017-02-06 RX ORDER — HYDROMORPHONE HYDROCHLORIDE 1 MG/ML
1 INJECTION, SOLUTION INTRAMUSCULAR; INTRAVENOUS; SUBCUTANEOUS
Status: COMPLETED | OUTPATIENT
Start: 2017-02-06 | End: 2017-02-06

## 2017-02-06 RX ORDER — SUCRALFATE 1 G/1
1 TABLET ORAL 4 TIMES DAILY
Qty: 30 TAB | Refills: 0 | Status: SHIPPED | OUTPATIENT
Start: 2017-02-06 | End: 2019-03-18 | Stop reason: SINTOL

## 2017-02-06 RX ORDER — DICYCLOMINE HYDROCHLORIDE 10 MG/1
20 CAPSULE ORAL
Status: COMPLETED | OUTPATIENT
Start: 2017-02-06 | End: 2017-02-06

## 2017-02-06 RX ORDER — HYDROCODONE BITARTRATE AND ACETAMINOPHEN 5; 325 MG/1; MG/1
1 TABLET ORAL
Qty: 12 TAB | Refills: 0 | Status: SHIPPED | OUTPATIENT
Start: 2017-02-06 | End: 2017-08-20

## 2017-02-06 RX ORDER — ONDANSETRON 2 MG/ML
4 INJECTION INTRAMUSCULAR; INTRAVENOUS
Status: COMPLETED | OUTPATIENT
Start: 2017-02-06 | End: 2017-02-06

## 2017-02-06 RX ORDER — DICYCLOMINE HYDROCHLORIDE 10 MG/1
20 CAPSULE ORAL 4 TIMES DAILY
Qty: 40 CAP | Refills: 0 | Status: SHIPPED | OUTPATIENT
Start: 2017-02-06 | End: 2017-02-11

## 2017-02-06 RX ADMIN — LIDOCAINE HYDROCHLORIDE 40 ML: 20 SOLUTION ORAL; TOPICAL at 11:26

## 2017-02-06 RX ADMIN — SODIUM CHLORIDE 40 MG: 9 INJECTION, SOLUTION INTRAMUSCULAR; INTRAVENOUS; SUBCUTANEOUS at 13:00

## 2017-02-06 RX ADMIN — ONDANSETRON 4 MG: 2 INJECTION INTRAMUSCULAR; INTRAVENOUS at 12:57

## 2017-02-06 RX ADMIN — MORPHINE SULFATE 4 MG: 4 INJECTION, SOLUTION INTRAMUSCULAR; INTRAVENOUS at 11:44

## 2017-02-06 RX ADMIN — SODIUM CHLORIDE 1000 ML: 900 INJECTION, SOLUTION INTRAVENOUS at 11:43

## 2017-02-06 RX ADMIN — HYDROMORPHONE HYDROCHLORIDE 1 MG: 1 INJECTION, SOLUTION INTRAMUSCULAR; INTRAVENOUS; SUBCUTANEOUS at 13:00

## 2017-02-06 RX ADMIN — DICYCLOMINE HYDROCHLORIDE 20 MG: 10 CAPSULE ORAL at 12:59

## 2017-02-06 NOTE — ED PROVIDER NOTES
HPI Comments: Rafael Amaya is a 31 y.o. male with hx significant for asthma who presents ambulatory to ED [facility] ED with cc of constant 6/10 \"tolerable, crampy\" epigastric and periumbilical abdominal pain which waxes and wanes in severity since 3/2016 which has progressively worsened in the last few days. Pt reports pain is exacerbated after a BM and is unsure if it is post-prandial. Pt also c/o SOB secondary to severe pain, nausea, diarrhea, and significant amount of bloating. Pt reports recent visit to ED [facility] ED on 1/29/17 and had a CT abdomen and abdominal US which were WNL. Pt reports he has been taking Percocet and Phenergan as prescribed from his last ED visit as well as Omeprazole, Gas-X, and Rolaids, with no relief. Pt reports he has an appointment with GI at [facility] on 2/14/17. Pt denies any stress, anxiety, depression, or changes in mood. Pt denies fever, dysuria, urinary frequency, urinary urgency, vomiting, or CP. Social Hx: +former tobacco, +EtOH (occ.), -illicit drug usage    There are no other complaints, changes or physical findings at this time. The history is provided by the patient. Past Medical History:   Diagnosis Date    Asthma        History reviewed. No pertinent past surgical history. History reviewed. No pertinent family history. Social History     Social History    Marital status: SINGLE     Spouse name: N/A    Number of children: N/A    Years of education: N/A     Occupational History    Not on file. Social History Main Topics    Smoking status: Former Smoker     Packs/day: 1.00     Years: 3.00    Smokeless tobacco: Not on file    Alcohol use 0.6 oz/week     1 Cans of beer per week    Drug use: No    Sexual activity: Yes     Partners: Female     Other Topics Concern    Not on file     Social History Narrative         ALLERGIES: Toradol [ketorolac tromethamine];  Motrin [ibuprofen]; and Ultram [tramadol]    Review of Systems Constitutional: Negative for chills and fever. Respiratory: Positive for shortness of breath. Negative for cough. Cardiovascular: Negative for chest pain. Gastrointestinal: Positive for abdominal pain, diarrhea and nausea. Negative for constipation and vomiting.        +bloating   Genitourinary: Negative for dysuria, frequency and urgency. Neurological: Negative for weakness and numbness. Psychiatric/Behavioral: The patient is not nervous/anxious. Denies stress, denies depression, denies mood changes   All other systems reviewed and are negative. Vitals:    02/06/17 1025 02/06/17 1030 02/06/17 1200   BP: (!) 134/93 143/83 128/82   Pulse: 84 81 72   Resp: 18 18 18   Temp: 97.9 °F (36.6 °C)     SpO2: 100% 100% 100%   Weight: 117.9 kg (259 lb 14.8 oz)     Height: 5' 8\" (1.727 m)              Physical Exam   Constitutional: He is oriented to person, place, and time. He appears well-developed and well-nourished. Appears uncomfortable   HENT:   Head: Normocephalic and atraumatic. Eyes: Conjunctivae and EOM are normal.   Neck: Normal range of motion. Neck supple. Cardiovascular: Normal rate and regular rhythm. Pulmonary/Chest: Effort normal and breath sounds normal. No respiratory distress. Abdominal: Soft. He exhibits no distension. There is tenderness in the epigastric area and periumbilical area. There is no rebound and no guarding. Musculoskeletal: Normal range of motion. Neurological: He is alert and oriented to person, place, and time. Skin: Skin is warm and dry. Psychiatric: He has a normal mood and affect. Nursing note and vitals reviewed. MDM  Number of Diagnoses or Management Options  Abdominal cramping:   Abdominal pain, epigastric:   Diagnosis management comments: Patient presents with abdominal pain.   DDx: IBS, Gastroenteritis, SBO, appendicitis, colitis, IBD, diverticulitis, mesenteric ischemia, AAA or descending dissection, ACS, ureteral  Stone,  pathology. Will get labs and serial abdominal exams. Pt had a CT abdomen and abdominal US in the last 2 weeks which was negative. Will try GI cocktail. Amount and/or Complexity of Data Reviewed  Clinical lab tests: ordered and reviewed  Tests in the radiology section of CPT®: ordered and reviewed  Review and summarize past medical records: yes    Patient Progress  Patient progress: stable    ED Course       Procedures    PROGRESS NOTE:  12:20 PM  Pt states that the GI cocktail made his stomach burn worse and he is still having the abdominal cramping. Pt stated that he had Protonix in the past which helped his pain a lot. Offered Bentyl and Carafate which pt has never taken. Written by Isaac Roberson ED Scribe, as dictated by Nayla Quan MD.    LABORATORY TESTS:  Recent Results (from the past 12 hour(s))   CBC WITH AUTOMATED DIFF    Collection Time: 02/06/17 11:35 AM   Result Value Ref Range    WBC 5.4 4.1 - 11.1 K/uL    RBC 5.04 4.10 - 5.70 M/uL    HGB 15.5 12.1 - 17.0 g/dL    HCT 47.5 36.6 - 50.3 %    MCV 94.2 80.0 - 99.0 FL    MCH 30.8 26.0 - 34.0 PG    MCHC 32.6 30.0 - 36.5 g/dL    RDW 13.6 11.5 - 14.5 %    PLATELET 362 502 - 424 K/uL    NEUTROPHILS 47 32 - 75 %    LYMPHOCYTES 41 12 - 49 %    MONOCYTES 11 5 - 13 %    EOSINOPHILS 1 0 - 7 %    BASOPHILS 0 0 - 1 %    ABS. NEUTROPHILS 2.5 1.8 - 8.0 K/UL    ABS. LYMPHOCYTES 2.3 0.8 - 3.5 K/UL    ABS. MONOCYTES 0.6 0.0 - 1.0 K/UL    ABS. EOSINOPHILS 0.1 0.0 - 0.4 K/UL    ABS.  BASOPHILS 0.0 0.0 - 0.1 K/UL   METABOLIC PANEL, COMPREHENSIVE    Collection Time: 02/06/17 11:35 AM   Result Value Ref Range    Sodium 143 136 - 145 mmol/L    Potassium 4.2 3.5 - 5.1 mmol/L    Chloride 108 97 - 108 mmol/L    CO2 27 21 - 32 mmol/L    Anion gap 8 5 - 15 mmol/L    Glucose 88 65 - 100 mg/dL    BUN 10 6 - 20 MG/DL    Creatinine 1.00 0.70 - 1.30 MG/DL    BUN/Creatinine ratio 10 (L) 12 - 20      GFR est AA >60 >60 ml/min/1.73m2    GFR est non-AA >60 >60 ml/min/1.73m2 Calcium 8.7 8.5 - 10.1 MG/DL    Bilirubin, total 0.2 0.2 - 1.0 MG/DL    ALT (SGPT) 56 12 - 78 U/L    AST (SGOT) 35 15 - 37 U/L    Alk. phosphatase 57 45 - 117 U/L    Protein, total 7.5 6.4 - 8.2 g/dL    Albumin 3.7 3.5 - 5.0 g/dL    Globulin 3.8 2.0 - 4.0 g/dL    A-G Ratio 1.0 (L) 1.1 - 2.2     LIPASE    Collection Time: 02/06/17 11:35 AM   Result Value Ref Range    Lipase 76 73 - 393 U/L       IMAGING RESULTS:  CXR Results  (Last 48 hours)               02/06/17 1120  XR ABD ACUTE W 1 V CHEST Final result    Impression:  IMPRESSION: No acute abnormality. Narrative:  EXAM:  XR ABD ACUTE W 1 V CHEST       INDICATION:  Intermittent epigastric and right upper quadrant pain starting   again this morning       COMPARISON: 3/25/2009. FINDINGS: The upright chest radiograph demonstrates clear lungs and normal   cardiac and mediastinal contours. There is no pleural effusion or free air under   the diaphragm. Supine and upright views of the abdomen demonstrate a nonobstructive bowel gas   pattern. There is no free intraperitoneal air. No soft tissue masses or   pathologic calcifications are identified. The bones are within normal limits. MEDICATIONS GIVEN:  Medications   mylanta/viscous lidocaine (STEPHANIE)(GI COCKTAIL) (40 mL Oral Given 2/6/17 1126)   sodium chloride 0.9 % bolus infusion 1,000 mL (0 mL IntraVENous IV Completed 2/6/17 1256)   morphine injection 4 mg (4 mg IntraVENous Given 2/6/17 1144)   pantoprazole (PROTONIX) 40 mg in sodium chloride 0.9 % 10 mL injection (40 mg IntraVENous Given 2/6/17 1300)   ondansetron (ZOFRAN) injection 4 mg (4 mg IntraVENous Given 2/6/17 1257)   dicyclomine (BENTYL) capsule 20 mg (20 mg Oral Given 2/6/17 1259)   HYDROmorphone (PF) (DILAUDID) injection 1 mg (1 mg IntraVENous Given 2/6/17 1300)       IMPRESSION:  1. Abdominal cramping    2.  Abdominal pain, epigastric        PLAN:  Current Discharge Medication List      START taking these medications    Details   sucralfate (CARAFATE) 1 gram tablet Take 1 Tab by mouth four (4) times daily. Qty: 30 Tab, Refills: 0      dicyclomine (BENTYL) 10 mg capsule Take 2 Caps by mouth four (4) times daily for 5 days. Qty: 40 Cap, Refills: 0      HYDROcodone-acetaminophen (NORCO) 5-325 mg per tablet Take 1 Tab by mouth every four (4) hours as needed for Pain. Max Daily Amount: 6 Tabs. Qty: 12 Tab, Refills: 0           Follow-up Information     Follow up With Details Comments Contact Info    Provider Unknown   Patient not available to ask      Your gastroenterologist  February 14         Return to ED if worse     DISCHARGE NOTE:  1:16 PM  Pt has been reexamined. Pt has no new complaints, changes, or physical findings. Care plan outlined and precautions discussed. All available results reviewed with pt. All medications reviewed with pt. All of pts questions and concerns addressed. Pt agrees to f/u as instructed and agrees to return to ED upon further deterioration. Pt is ready to go home. ATTESTATION:  This note is prepared by Deandre Butler, acting as Scribe for Christiane Melendez MD.    Christiane Melendez MD and personally reviewed by me in its entirety. I confirm that the note above accurately reflects all work, treatment, procedures, and medical decision making performed by me.

## 2017-02-06 NOTE — DISCHARGE INSTRUCTIONS

## 2017-02-06 NOTE — ED TRIAGE NOTES
Triage Note: Pt c/o intermitted epigastric-RUQ abdominal pain since March 2016. Started again this morning. Seen recently for the same and has f/u with GI. Denies vomiting but reports diarrhea. Received rx for percocet and promethazine at last visit but is out of the percocet.

## 2017-02-23 ENCOUNTER — OFFICE VISIT (OUTPATIENT)
Dept: SURGERY | Age: 37
End: 2017-02-23

## 2017-02-23 VITALS
BODY MASS INDEX: 38.28 KG/M2 | HEIGHT: 68 IN | HEART RATE: 61 BPM | SYSTOLIC BLOOD PRESSURE: 109 MMHG | DIASTOLIC BLOOD PRESSURE: 89 MMHG | WEIGHT: 252.6 LBS

## 2017-02-23 DIAGNOSIS — K92.1 HEMATOCHEZIA: ICD-10-CM

## 2017-02-23 DIAGNOSIS — K21.9 GASTROESOPHAGEAL REFLUX DISEASE, ESOPHAGITIS PRESENCE NOT SPECIFIED: ICD-10-CM

## 2017-02-23 DIAGNOSIS — R19.7 DIARRHEA IN ADULT PATIENT: ICD-10-CM

## 2017-02-23 DIAGNOSIS — R10.13 EPIGASTRIC PAIN: Primary | ICD-10-CM

## 2017-02-23 DIAGNOSIS — R10.30 LOWER ABDOMINAL PAIN: ICD-10-CM

## 2017-02-23 DIAGNOSIS — R11.2 NON-INTRACTABLE VOMITING WITH NAUSEA, UNSPECIFIED VOMITING TYPE: ICD-10-CM

## 2017-02-23 RX ORDER — DEXLANSOPRAZOLE 60 MG/1
CAPSULE, DELAYED RELEASE ORAL
COMMUNITY
End: 2019-03-18 | Stop reason: SINTOL

## 2017-02-23 RX ORDER — DICYCLOMINE HYDROCHLORIDE 20 MG/1
20 TABLET ORAL EVERY 6 HOURS
COMMUNITY
End: 2020-10-20

## 2017-02-23 RX ORDER — FAMOTIDINE 20 MG/1
20 TABLET, FILM COATED ORAL 2 TIMES DAILY
COMMUNITY
End: 2019-03-18 | Stop reason: SINTOL

## 2017-02-24 RX ORDER — POLYETHYLENE GLYCOL 3350, SODIUM CHLORIDE, SODIUM BICARBONATE, POTASSIUM CHLORIDE 420; 11.2; 5.72; 1.48 G/4L; G/4L; G/4L; G/4L
POWDER, FOR SOLUTION ORAL
Qty: 1 BOTTLE | Refills: 0 | Status: SHIPPED | OUTPATIENT
Start: 2017-02-24 | End: 2019-03-26

## 2017-02-24 RX ORDER — BISACODYL 5 MG
TABLET, DELAYED RELEASE (ENTERIC COATED) ORAL
Qty: 1 TAB | Refills: 0 | Status: SHIPPED | OUTPATIENT
Start: 2017-02-24 | End: 2020-10-20

## 2017-02-24 NOTE — PROGRESS NOTES
[address] [facility], [facility] [place]  Phone: [phone] Fax: [phone]                                              OFFICE NOTE    NAME: Haven Rosales  : 1980    Chief Complaint:   Abdominal pain  GERD  Diarrhea  Vomiting  hematochezia    History: Haven Rosales is a 31 y.o. male who for over one year has been going to multiple ERs with GI complaints. He has had multiple CT scans and labs that have not been able to find out his problem. This is the first time he has seen anyone outside the ER. He c/o of constant, day and night, epigastric pain that is sharp and crampy. He says the percocet and the Bentyl help a little. The pains occur every 5 minutes and do keep him up at night. He also has burning in his throat and mouth. If he bends forward the pain is helped and it is aggravated by doing a sit up. He is on Pepcid bid and Dexilent was recently added. He also has been on Zantac. He changed his eating habits and avoided spicy, fatty foods but has seen no relief. He was told by one ER doctor to avoid green leafy vegs and fruits. He denies any dysphagia. He occasionally vomits clear mucous and the food he has eaten. No coffee ground or blood. He also clais to have over 20 bms a day that vary in color from bright green to forest green. They are occasionally semi formed. There is no mucous noted but lots of gas. He has some rectal pain. He also seen blood a lot, sometimes mixed with the stool and some times by it self. He believes he has hemorrhoids that fall out and he pushes them in with suppositories. At times he has lower abdominal cramping. He used to be a heavy alcohol drinker and hjas been quit for 4 months. He could drink from a pint to a fifth a day. He does not smoke. He does take the Bentyl qid and the Percocet prn.       Past Medical History:   Diagnosis Date    Asthma     Change in bowel habits     Constipation     Diarrhea     GERD (gastroesophageal reflux disease)      History reviewed. No pertinent surgical history. Current Outpatient Prescriptions   Medication Sig Dispense Refill    peg-electrolyte soln (GAVILYTE-N) 420 gram solution Take as directed  Indications: BOWEL EVACUATION 1 Bottle 0    bisacodyl (DULCOLAX, BISACODYL,) 5 mg EC tablet Take as directed  Indications: BOWEL EVACUATION 1 Tab 0    Dexlansoprazole (DEXILANT) 60 mg CpDB Take  by mouth.  dicyclomine (BENTYL) 20 mg tablet Take 20 mg by mouth every six (6) hours.  famotidine (PEPCID) 20 mg tablet Take 20 mg by mouth two (2) times a day.  sucralfate (CARAFATE) 1 gram tablet Take 1 Tab by mouth four (4) times daily. 30 Tab 0    HYDROcodone-acetaminophen (NORCO) 5-325 mg per tablet Take 1 Tab by mouth every four (4) hours as needed for Pain. Max Daily Amount: 6 Tabs. 12 Tab 0     Allergies   Allergen Reactions    Toradol [Ketorolac Tromethamine] Anaphylaxis    Motrin [Ibuprofen] Hives     Pt reports he is not allergic to this medication.  Ultram [Tramadol] Hives     Social History     Social History    Marital status: SINGLE     Spouse name: N/A    Number of children: N/A    Years of education: N/A     Occupational History    Not on file. Social History Main Topics    Smoking status: Former Smoker     Packs/day: 1.00     Years: 3.00    Smokeless tobacco: Not on file    Alcohol use 0.6 oz/week     1 Cans of beer per week    Drug use: No    Sexual activity: Yes     Partners: Female     Other Topics Concern    Not on file     Social History Narrative     Family History   Problem Relation Age of Onset    No Known Problems Mother        There is no problem list on file for this patient. Review of Systems:  Review of Systems   Constitutional: Negative for chills, fever, malaise/fatigue and weight loss. HENT: Negative for congestion, ear discharge, ear pain, hearing loss, nosebleeds, sore throat and tinnitus.     Eyes: Negative for blurred vision, double vision, pain, discharge and redness. Respiratory: Negative for cough, sputum production, shortness of breath and wheezing. Cardiovascular: Positive for leg swelling. Negative for chest pain and palpitations. Gastrointestinal: Positive for abdominal pain, blood in stool, diarrhea, heartburn, nausea and vomiting. Negative for constipation and melena. Genitourinary: Negative for frequency, hematuria and urgency. Musculoskeletal: Negative for back pain, joint pain and neck pain. Skin: Negative for itching and rash. Neurological: Negative for dizziness, tremors, focal weakness, seizures, weakness and headaches. Endo/Heme/Allergies: Does not bruise/bleed easily. Psychiatric/Behavioral: Negative for depression and memory loss. The patient is not nervous/anxious and does not have insomnia. Physical Exam:  Visit Vitals    /89    Pulse 61    Ht 5' 8\" (1.727 m)    Wt 252 lb 9.6 oz (114.6 kg)    BMI 38.41 kg/m2       Physical Exam   Constitutional: He is oriented to person, place, and time. He appears well-developed and well-nourished. No distress. Patient not in distress   HENT:   Head: Normocephalic and atraumatic. Right Ear: External ear normal.   Left Ear: External ear normal.   Nose: Nose normal.   Mouth/Throat: Oropharynx is clear and moist. No oropharyngeal exudate. Eyes: EOM are normal. Pupils are equal, round, and reactive to light. Neck: No thyromegaly present. Cardiovascular: Normal rate, regular rhythm and normal heart sounds. Exam reveals no friction rub. No murmur heard. Pulmonary/Chest: Effort normal and breath sounds normal. He has no wheezes. He has no rales. Abdominal: Soft. He exhibits no distension and no mass. There is no tenderness. There is no rebound and no guarding. Musculoskeletal: Normal range of motion. Lymphadenopathy:     He has no cervical adenopathy. Neurological: He is alert and oriented to person, place, and time.    Skin: Skin is warm and dry. No rash noted. He is not diaphoretic. No erythema. Psychiatric: He has a normal mood and affect. His behavior is normal. Judgment and thought content normal.       Impression:  GERD  Abdominal pain  Diarrhea  Hematochezia  vomiting    Plan:  EGD/colonoscopy on 1/1/49  Risks and complications were explained to patient and they voiced understanding.     Fariba Us M.D.

## 2019-03-18 PROBLEM — E66.01 SEVERE OBESITY (HCC): Status: ACTIVE | Noted: 2019-03-18

## 2019-03-26 PROBLEM — E66.01 SEVERE OBESITY (HCC): Status: RESOLVED | Noted: 2019-03-18 | Resolved: 2019-03-26

## 2019-03-26 PROBLEM — F43.10 PTSD (POST-TRAUMATIC STRESS DISORDER): Status: ACTIVE | Noted: 2019-03-26

## 2019-03-26 PROBLEM — F31.60 BIPOLAR 1 DISORDER, MIXED (HCC): Status: ACTIVE | Noted: 2019-03-26

## 2021-03-11 ENCOUNTER — TELEPHONE (OUTPATIENT)
Dept: PSYCHIATRY | Age: 41
End: 2021-03-11

## 2021-03-11 RX ORDER — ARIPIPRAZOLE 10 MG/1
10 TABLET ORAL
Qty: 30 TAB | Refills: 1 | Status: SHIPPED | OUTPATIENT
Start: 2021-03-11 | End: 2021-04-12 | Stop reason: SDUPTHER

## 2021-04-12 ENCOUNTER — HOSPITAL ENCOUNTER (OUTPATIENT)
Dept: BEHAVIORAL/MENTAL HEALTH | Age: 41
Discharge: HOME | End: 2021-04-12

## 2021-04-12 VITALS
SYSTOLIC BLOOD PRESSURE: 110 MMHG | DIASTOLIC BLOOD PRESSURE: 84 MMHG | WEIGHT: 279 LBS | HEART RATE: 88 BPM | BODY MASS INDEX: 42.42 KG/M2

## 2021-04-12 RX ORDER — OXCARBAZEPINE 300 MG/1
300 TABLET, FILM COATED ORAL 2 TIMES DAILY
Qty: 60 TAB | Refills: 1 | Status: SHIPPED | OUTPATIENT
Start: 2021-04-12

## 2021-04-12 RX ORDER — ARIPIPRAZOLE 10 MG/1
10 TABLET ORAL
Qty: 90 TAB | Refills: 1 | Status: SHIPPED | OUTPATIENT
Start: 2021-04-12

## 2021-04-12 NOTE — BH NOTES
Name: Cristina Kaur    MRN:  [id number]   Time In: 10:10 AM     Time Out: 10:28 AM  Date: 4/12/2021           Collateral: none    Patient Identification: Cristina Kaur is a 47year old  father of 4. He lives with his wife and her 3 children. 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 Progress Note: Edwar Francis has been doing really well on the medication. He is better able to deal with other people and is able to keep himself from exploding in anger. He still avoids a lot of people because he is easily aggravated. He recently had to walk out on his mom because she was getting on his nerves. He and his wife are getting along much better. He was able to enjoy an outing with his 25year old son to an [place] park. He also goes fishing which helps calm him down    Edwar Francis has been sleeping well for the most part. A few times per month he gets real jittery and can't sleep because he is all sweaty. He generally drinks a lot of water or gatorade. Edwar Francis reports that the new medicine (oxcarbazepine) helps with the anxiety. He has been taking it as needed rather than daily. He would be willing to try it daily for the irritability. Edwar Francis is walking about a mile a day. He doesn't do any other exercise. He was happy to hear that his weight is down 5 lbs. He denies alcohol, marijuana, cigarette or other drug use. Mental Status Examination    I. Reliability in Providing Information: Good (04/12/21 1028)    II. Personal Presentation: Looks stated age;Dresses appropriately (04/12/21 1028)    III. Motor Activity: Normal;Unremarkable (04/12/21 1028)    IV. Speech Pattern: Normal rate;Normal rhythm (04/12/21 1028)    V. Mood: Euthymic (04/12/21 1028)    Vl. Eye Contact: Good (04/12/21 1028)    Vll. Affect: Appropriate; Full (04/12/21 1028)    VllI.  Thought Processes        Thought Process: Organized;Goal directed (04/12/21 1028)        Thought Content: Unremarkable (04/12/21 1028) Hallucinations: None (04/12/21 1028)        Delusions: None (04/12/21 1028)        Suicidal Ideation/Attempts: No (04/12/21 1028)                 Homicidal Ideation/Attempts: No (04/12/21 1028)        Obsessional and Compulsive Symptoms: Absent (04/12/21 1028)    IX. Cognitive Functions       Orientation Level: Oriented x4 (04/12/21 1028)       Neurologic State: Alert (04/12/21 1028)       Attention/Concentration: Attentive (04/12/21 1028)       Abstract Thinking: Intact (04/12/21 1028)       Estimate of Intelligence: Average (04/12/21 1028)       Judgment: Good (04/12/21 1028)       Insight: Fair (04/12/21 1028)       Memory evaluation: No (04/12/21 1028)                                    X.Risks: None evident (04/12/21 1028)     XI. Strengths and Assets Inventory: Intelligence; Cooperative; Family Support; Adequate living arrangements; Interests/Hobbies (04/12/21 1028)    VITALS:     Patient Vitals for the past 24 hrs:   Pulse BP   04/12/21 1021 88 110/84     Wt Readings from Last 3 Encounters:   04/12/21 126.6 kg (279 lb)   10/08/20 128.8 kg (284 lb)   08/24/20 130.2 kg (287 lb)     Temp Readings from Last 3 Encounters:   02/24/20 98.3 °F (36.8 °C)   04/28/19 98.2 °F (36.8 °C)   03/18/19 98.3 °F (36.8 °C) (Temporal)     BP Readings from Last 3 Encounters:   04/12/21 110/84   08/24/20 122/80   02/24/20 144/88     Pulse Readings from Last 3 Encounters:   04/12/21 88   08/24/20 88   02/24/20 81       Assessment: Deanna Valentin might benefit from daily oxcarbazepine for irritability and likely anxiety. DSM 5 Diagnoses:     Patient Active Problem List    Diagnosis Date Noted    Bipolar 1 disorder, mixed ([facility] [address] 03/26/2019    PTSD (post-traumatic stress disorder) 03/26/2019         Plan:   1. Continue Abilify 10 mg daily at HS. 2.  Restart oxcarbazepine 300 mg po daily. 3.  Discussed importance of reducing intake of food after 8 PM  4. Return for follow up in 3 months.

## 2021-04-30 ENCOUNTER — TELEPHONE (OUTPATIENT)
Dept: FAMILY MEDICINE CLINIC | Age: 41
End: 2021-04-30

## 2021-04-30 NOTE — TELEPHONE ENCOUNTER
----- Message from Hung Sanjeevmariaa sent at 2021  3:47 PM EDT -----  Regarding: MD Urban/ refill  Medication Refill      Patient's first and last name: Jessy Canchola  :  1980    Caller (if not patient): Josh Jona    Relationship of caller (if not patient):   Spouse    Best contact number(s):    [phone]    Name of medication and dosage if known: Albuterol    Is patient out of this medication (yes/no): yes    Pharmacy name:  [facility] listed in chart? (yes/no): yes    Pharmacy phone number: n/a    Details to clarify the request: Pt would like to request medication to pharmacy on file. Pt is currently out of medication.

## 2022-03-19 PROBLEM — F31.60 BIPOLAR 1 DISORDER, MIXED (HCC): Status: ACTIVE | Noted: 2019-03-26

## 2022-03-19 PROBLEM — F43.10 PTSD (POST-TRAUMATIC STRESS DISORDER): Status: ACTIVE | Noted: 2019-03-26

## 2022-03-21 PROBLEM — F31.9 BIPOLAR 1 DISORDER: Status: ACTIVE | Noted: 2022-03-21

## 2022-03-21 PROBLEM — F10.10 ALCOHOL USE DISORDER, MILD, ABUSE: Status: ACTIVE | Noted: 2022-03-21

## 2022-03-22 PROBLEM — F15.90 STIMULANT USE DISORDER: Status: ACTIVE | Noted: 2022-03-22

## 2023-04-11 ENCOUNTER — PATIENT MESSAGE (OUTPATIENT)
Dept: RESEARCH | Facility: HOSPITAL | Age: 43
End: 2023-04-11

## 2023-05-11 RX ORDER — NAPROXEN 500 MG/1
TABLET ORAL
COMMUNITY
Start: 2020-02-24

## 2023-05-11 RX ORDER — CHOLESTYRAMINE LIGHT 4 G/5.7G
POWDER, FOR SUSPENSION ORAL DAILY PRN
COMMUNITY
Start: 2020-10-20

## 2023-05-11 RX ORDER — SUCRALFATE 1 G/1
TABLET ORAL DAILY
COMMUNITY
Start: 2020-10-20

## 2023-05-11 RX ORDER — OXCARBAZEPINE 300 MG/1
TABLET, FILM COATED ORAL 2 TIMES DAILY
COMMUNITY
Start: 2021-04-12

## 2023-05-11 RX ORDER — ALBUTEROL SULFATE 90 UG/1
1 AEROSOL, METERED RESPIRATORY (INHALATION) EVERY 4 HOURS PRN
COMMUNITY
Start: 2019-02-19

## 2023-05-11 RX ORDER — OMEPRAZOLE 20 MG/1
CAPSULE, DELAYED RELEASE ORAL 2 TIMES DAILY
COMMUNITY

## 2023-05-11 RX ORDER — DICYCLOMINE HYDROCHLORIDE 10 MG/1
CAPSULE ORAL 2 TIMES DAILY PRN
COMMUNITY
Start: 2020-10-20

## 2023-05-11 RX ORDER — ARIPIPRAZOLE 10 MG/1
TABLET ORAL
COMMUNITY
Start: 2021-04-12

## 2024-10-30 ENCOUNTER — PATIENT MESSAGE (OUTPATIENT)
Dept: RESEARCH | Facility: HOSPITAL | Age: 44
End: 2024-10-30

## 2024-11-04 ENCOUNTER — PATIENT MESSAGE (OUTPATIENT)
Dept: RESEARCH | Facility: HOSPITAL | Age: 44
End: 2024-11-04